# Patient Record
Sex: MALE | Race: WHITE | NOT HISPANIC OR LATINO | Employment: FULL TIME | URBAN - METROPOLITAN AREA
[De-identification: names, ages, dates, MRNs, and addresses within clinical notes are randomized per-mention and may not be internally consistent; named-entity substitution may affect disease eponyms.]

---

## 2023-10-18 ENCOUNTER — LAB (OUTPATIENT)
Dept: LAB | Facility: LAB | Age: 38
End: 2023-10-18
Payer: COMMERCIAL

## 2023-10-18 DIAGNOSIS — K51.90 ULCERATIVE COLITIS, UNSPECIFIED, WITHOUT COMPLICATIONS (MULTI): Primary | ICD-10-CM

## 2023-10-18 LAB
ALBUMIN SERPL-MCNC: 4.1 G/DL (ref 3.5–5)
ALP BLD-CCNC: 95 U/L (ref 35–125)
ALT SERPL-CCNC: 15 U/L (ref 5–40)
AST SERPL-CCNC: 14 U/L (ref 5–40)
BASOPHILS # BLD AUTO: 0.05 X10*3/UL (ref 0–0.1)
BASOPHILS NFR BLD AUTO: 0.5 %
BILIRUB DIRECT SERPL-MCNC: <0.2 MG/DL (ref 0–0.2)
BILIRUB SERPL-MCNC: 0.6 MG/DL (ref 0.1–1.2)
EOSINOPHIL # BLD AUTO: 0.16 X10*3/UL (ref 0–0.7)
EOSINOPHIL NFR BLD AUTO: 1.6 %
ERYTHROCYTE [DISTWIDTH] IN BLOOD BY AUTOMATED COUNT: 14.2 % (ref 11.5–14.5)
HCT VFR BLD AUTO: 41.9 % (ref 41–52)
HGB BLD-MCNC: 13 G/DL (ref 13.5–17.5)
IMM GRANULOCYTES # BLD AUTO: 0.04 X10*3/UL (ref 0–0.7)
IMM GRANULOCYTES NFR BLD AUTO: 0.4 % (ref 0–0.9)
LYMPHOCYTES # BLD AUTO: 3.41 X10*3/UL (ref 1.2–4.8)
LYMPHOCYTES NFR BLD AUTO: 33.2 %
MCH RBC QN AUTO: 25.4 PG (ref 26–34)
MCHC RBC AUTO-ENTMCNC: 31 G/DL (ref 32–36)
MCV RBC AUTO: 82 FL (ref 80–100)
MONOCYTES # BLD AUTO: 1.25 X10*3/UL (ref 0.1–1)
MONOCYTES NFR BLD AUTO: 12.2 %
NEUTROPHILS # BLD AUTO: 5.37 X10*3/UL (ref 1.2–7.7)
NEUTROPHILS NFR BLD AUTO: 52.1 %
NRBC BLD-RTO: 0 /100 WBCS (ref 0–0)
PLATELET # BLD AUTO: 348 X10*3/UL (ref 150–450)
PMV BLD AUTO: 9.9 FL (ref 7.5–11.5)
PROT SERPL-MCNC: 6.9 G/DL (ref 5.9–7.9)
RBC # BLD AUTO: 5.11 X10*6/UL (ref 4.5–5.9)
WBC # BLD AUTO: 10.3 X10*3/UL (ref 4.4–11.3)

## 2023-10-18 PROCEDURE — 36415 COLL VENOUS BLD VENIPUNCTURE: CPT

## 2023-10-18 PROCEDURE — 85025 COMPLETE CBC W/AUTO DIFF WBC: CPT

## 2023-10-18 PROCEDURE — 80076 HEPATIC FUNCTION PANEL: CPT

## 2023-10-19 ENCOUNTER — LAB (OUTPATIENT)
Dept: LAB | Facility: LAB | Age: 38
End: 2023-10-19
Payer: COMMERCIAL

## 2023-10-19 LAB — C DIF TOX TCDA+TCDB STL QL NAA+PROBE: DETECTED

## 2023-10-19 PROCEDURE — 87493 C DIFF AMPLIFIED PROBE: CPT

## 2023-11-01 DIAGNOSIS — K51.90 ULCERATIVE COLITIS, UNSPECIFIED, WITHOUT COMPLICATIONS (MULTI): Primary | ICD-10-CM

## 2023-11-02 ENCOUNTER — LAB (OUTPATIENT)
Dept: LAB | Facility: LAB | Age: 38
End: 2023-11-02
Payer: COMMERCIAL

## 2023-11-02 ENCOUNTER — APPOINTMENT (OUTPATIENT)
Dept: LAB | Facility: LAB | Age: 38
End: 2023-11-02
Payer: COMMERCIAL

## 2023-11-02 DIAGNOSIS — K51.90 ULCERATIVE COLITIS, UNSPECIFIED, WITHOUT COMPLICATIONS (MULTI): Primary | ICD-10-CM

## 2023-11-02 LAB — C DIF TOX TCDA+TCDB STL QL NAA+PROBE: DETECTED

## 2023-11-02 PROCEDURE — 87493 C DIFF AMPLIFIED PROBE: CPT

## 2024-01-26 ENCOUNTER — OFFICE VISIT (OUTPATIENT)
Dept: PRIMARY CARE | Facility: CLINIC | Age: 39
End: 2024-01-26
Payer: COMMERCIAL

## 2024-01-26 VITALS
BODY MASS INDEX: 34.72 KG/M2 | HEART RATE: 95 BPM | SYSTOLIC BLOOD PRESSURE: 98 MMHG | OXYGEN SATURATION: 98 % | TEMPERATURE: 98.3 F | WEIGHT: 242 LBS | DIASTOLIC BLOOD PRESSURE: 62 MMHG

## 2024-01-26 DIAGNOSIS — U07.1 COVID-19: Primary | ICD-10-CM

## 2024-01-26 PROCEDURE — 1036F TOBACCO NON-USER: CPT

## 2024-01-26 PROCEDURE — 99213 OFFICE O/P EST LOW 20 MIN: CPT

## 2024-01-26 RX ORDER — ALBUTEROL SULFATE 90 UG/1
2 AEROSOL, METERED RESPIRATORY (INHALATION) EVERY 4 HOURS PRN
Qty: 8.5 G | Refills: 0 | Status: SHIPPED | OUTPATIENT
Start: 2024-01-26 | End: 2025-01-25

## 2024-01-26 RX ORDER — FLUTICASONE PROPIONATE 50 MCG
1 SPRAY, SUSPENSION (ML) NASAL DAILY
Qty: 16 G | Refills: 5 | Status: SHIPPED | OUTPATIENT
Start: 2024-01-26 | End: 2025-01-25

## 2024-01-26 RX ORDER — UPADACITINIB 45 MG/1
45 TABLET, EXTENDED RELEASE ORAL DAILY
COMMUNITY

## 2024-01-26 ASSESSMENT — PATIENT HEALTH QUESTIONNAIRE - PHQ9
SUM OF ALL RESPONSES TO PHQ9 QUESTIONS 1 AND 2: 0
2. FEELING DOWN, DEPRESSED OR HOPELESS: NOT AT ALL
1. LITTLE INTEREST OR PLEASURE IN DOING THINGS: NOT AT ALL

## 2024-01-26 ASSESSMENT — ENCOUNTER SYMPTOMS
COUGH: 1
CHILLS: 1
MYALGIAS: 1

## 2024-01-26 ASSESSMENT — PAIN SCALES - GENERAL: PAINLEVEL: 3

## 2024-01-26 NOTE — PROGRESS NOTES
Subjective   Patient ID: Eren Palacios is a 38 y.o. male who presents for Sick Visit (Pt tested positive for covid yesterday and is c/o congestion, cough, body aches, and chills/sweats.).    HPI   Eren is seen today for c/o COVID-19 symptoms. Endorses cough, congestion, myalgias, chills/sweats, loss of taste that started yesterday. Tested positive yesterday. Has been taking Ibuprofen with relief. Tolerating fluids. History RAIN, UC on Rinvoq 45mg extended release. Denies history of asthma, non-smoker. Denies chest pain, shortness of breath, nausea, vomiting.    Review of Systems   Constitutional:  Positive for chills.   HENT:  Positive for congestion.    Respiratory:  Positive for cough.    Musculoskeletal:  Positive for myalgias.   All other systems reviewed and are negative.    Objective   BP 98/62   Pulse 95   Temp 36.8 °C (98.3 °F)   Wt 110 kg (242 lb)   SpO2 98%   BMI 34.72 kg/m²     Physical Exam  Vitals and nursing note reviewed.   Constitutional:       General: He is not in acute distress.  HENT:      Right Ear: Tympanic membrane and ear canal normal.      Left Ear: Tympanic membrane and ear canal normal.      Nose: Nose normal.      Mouth/Throat:      Pharynx: No oropharyngeal exudate or posterior oropharyngeal erythema.   Eyes:      Extraocular Movements: Extraocular movements intact.      Pupils: Pupils are equal, round, and reactive to light.   Cardiovascular:      Rate and Rhythm: Normal rate and regular rhythm.   Pulmonary:      Effort: Pulmonary effort is normal.      Breath sounds: Normal breath sounds.   Abdominal:      Tenderness: There is no abdominal tenderness.   Lymphadenopathy:      Cervical: No cervical adenopathy.   Skin:     General: Skin is warm.   Neurological:      General: No focal deficit present.      Mental Status: He is alert.   Psychiatric:         Mood and Affect: Mood normal.       Assessment/Plan   Problem List Items Addressed This Visit    None  Visit Diagnoses          Codes    COVID-19    -  Primary  Discussed supportive care due to mild symptoms vs. Lowering dose of Rinvoq and starting Paxlovid. Opted for supportive care.   Flonase as directed for congestion. OTC Tylenol or Ibuprofen for aches, fever. Fluids, rest  Albuterol inhaler as needed for cough, wheezing, shortness of breath.   Discussed CDC isolation guidelines.   Follow up if symptoms do not improve. Go to ER if condition worsens or becomes life-threatening.  U07.1    Relevant Medications    fluticasone (Flonase) 50 mcg/actuation nasal spray    albuterol (ProAir HFA) 90 mcg/actuation inhaler

## 2024-02-24 DIAGNOSIS — U07.1 COVID-19: ICD-10-CM

## 2024-02-26 RX ORDER — ALBUTEROL SULFATE 90 UG/1
2 AEROSOL, METERED RESPIRATORY (INHALATION) EVERY 4 HOURS PRN
OUTPATIENT
Start: 2024-02-26 | End: 2025-02-25

## 2024-03-05 DIAGNOSIS — K51.90 ULCERATIVE COLITIS, UNSPECIFIED, WITHOUT COMPLICATIONS (MULTI): Primary | ICD-10-CM

## 2024-03-07 ENCOUNTER — LAB (OUTPATIENT)
Dept: LAB | Facility: LAB | Age: 39
End: 2024-03-07
Payer: COMMERCIAL

## 2024-03-07 DIAGNOSIS — K51.90 ULCERATIVE COLITIS, UNSPECIFIED, WITHOUT COMPLICATIONS (MULTI): ICD-10-CM

## 2024-03-07 DIAGNOSIS — D64.9 ANEMIA, UNSPECIFIED TYPE: ICD-10-CM

## 2024-03-07 LAB
ALBUMIN SERPL-MCNC: 4.1 G/DL (ref 3.5–5)
ALP BLD-CCNC: 64 U/L (ref 35–125)
ALT SERPL-CCNC: 15 U/L (ref 5–40)
ANION GAP SERPL CALC-SCNC: 11 MMOL/L
AST SERPL-CCNC: 14 U/L (ref 5–40)
BASOPHILS # BLD AUTO: 0.05 X10*3/UL (ref 0–0.1)
BASOPHILS NFR BLD AUTO: 0.5 %
BILIRUB SERPL-MCNC: 0.5 MG/DL (ref 0.1–1.2)
BUN SERPL-MCNC: 13 MG/DL (ref 8–25)
C DIF TOX TCDA+TCDB STL QL NAA+PROBE: NOT DETECTED
CALCIUM SERPL-MCNC: 8.9 MG/DL (ref 8.5–10.4)
CHLORIDE SERPL-SCNC: 105 MMOL/L (ref 97–107)
CO2 SERPL-SCNC: 24 MMOL/L (ref 24–31)
CREAT SERPL-MCNC: 1.4 MG/DL (ref 0.4–1.6)
DACRYOCYTES BLD QL SMEAR: NORMAL
EGFRCR SERPLBLD CKD-EPI 2021: 67 ML/MIN/1.73M*2
EOSINOPHIL # BLD AUTO: 0.2 X10*3/UL (ref 0–0.7)
EOSINOPHIL NFR BLD AUTO: 2.1 %
ERYTHROCYTE [DISTWIDTH] IN BLOOD BY AUTOMATED COUNT: 18.6 % (ref 11.5–14.5)
GLUCOSE SERPL-MCNC: 95 MG/DL (ref 65–99)
HCT VFR BLD AUTO: 30.9 % (ref 41–52)
HGB BLD-MCNC: 8.4 G/DL (ref 13.5–17.5)
HYPOCHROMIA BLD QL SMEAR: NORMAL
IMM GRANULOCYTES # BLD AUTO: 0.02 X10*3/UL (ref 0–0.7)
IMM GRANULOCYTES NFR BLD AUTO: 0.2 % (ref 0–0.9)
LYMPHOCYTES # BLD AUTO: 2.4 X10*3/UL (ref 1.2–4.8)
LYMPHOCYTES NFR BLD AUTO: 25.6 %
MCH RBC QN AUTO: 19.4 PG (ref 26–34)
MCHC RBC AUTO-ENTMCNC: 27.2 G/DL (ref 32–36)
MCV RBC AUTO: 72 FL (ref 80–100)
MONOCYTES # BLD AUTO: 1.38 X10*3/UL (ref 0.1–1)
MONOCYTES NFR BLD AUTO: 14.7 %
NEUTROPHILS # BLD AUTO: 5.34 X10*3/UL (ref 1.2–7.7)
NEUTROPHILS NFR BLD AUTO: 56.9 %
NRBC BLD-RTO: 0 /100 WBCS (ref 0–0)
OVALOCYTES BLD QL SMEAR: NORMAL
PHOSPHATE SERPL-MCNC: 3.4 MG/DL (ref 2.5–4.5)
PLATELET # BLD AUTO: 429 X10*3/UL (ref 150–450)
POLYCHROMASIA BLD QL SMEAR: NORMAL
POTASSIUM SERPL-SCNC: 4.2 MMOL/L (ref 3.4–5.1)
PROT SERPL-MCNC: 7.2 G/DL (ref 5.9–7.9)
PTH-INTACT SERPL-MCNC: 60.7 PG/ML (ref 18.5–88)
RBC # BLD AUTO: 4.32 X10*6/UL (ref 4.5–5.9)
RBC MORPH BLD: NORMAL
SODIUM SERPL-SCNC: 140 MMOL/L (ref 133–145)
WBC # BLD AUTO: 9.4 X10*3/UL (ref 4.4–11.3)

## 2024-03-07 PROCEDURE — 85025 COMPLETE CBC W/AUTO DIFF WBC: CPT

## 2024-03-07 PROCEDURE — 82607 VITAMIN B-12: CPT

## 2024-03-07 PROCEDURE — 82746 ASSAY OF FOLIC ACID SERUM: CPT

## 2024-03-07 PROCEDURE — 83970 ASSAY OF PARATHORMONE: CPT

## 2024-03-07 PROCEDURE — 87493 C DIFF AMPLIFIED PROBE: CPT

## 2024-03-07 PROCEDURE — 82728 ASSAY OF FERRITIN: CPT

## 2024-03-07 PROCEDURE — 83550 IRON BINDING TEST: CPT

## 2024-03-07 PROCEDURE — 84100 ASSAY OF PHOSPHORUS: CPT

## 2024-03-07 PROCEDURE — 36415 COLL VENOUS BLD VENIPUNCTURE: CPT

## 2024-03-07 PROCEDURE — 80053 COMPREHEN METABOLIC PANEL: CPT

## 2024-03-07 PROCEDURE — 83540 ASSAY OF IRON: CPT

## 2024-03-08 ENCOUNTER — TELEPHONE (OUTPATIENT)
Dept: GASTROENTEROLOGY | Facility: HOSPITAL | Age: 39
End: 2024-03-08

## 2024-03-08 DIAGNOSIS — D64.9 ANEMIA, UNSPECIFIED TYPE: Primary | ICD-10-CM

## 2024-03-08 LAB
FERRITIN SERPL-MCNC: 7 NG/ML (ref 30–400)
FOLATE SERPL-MCNC: 7.7 NG/ML (ref 4.2–19.9)
IRON SATN MFR SERPL: ABNORMAL %
IRON SERPL-MCNC: <20 UG/DL (ref 45–160)
TIBC SERPL-MCNC: ABNORMAL UG/DL
UIBC SERPL-MCNC: 352 UG/DL (ref 110–370)
VIT B12 SERPL-MCNC: 398 PG/ML (ref 211–946)

## 2024-10-22 ENCOUNTER — OFFICE VISIT (OUTPATIENT)
Dept: PRIMARY CARE | Facility: EXTERNAL LOCATION | Age: 39
End: 2024-10-22

## 2024-10-22 VITALS
SYSTOLIC BLOOD PRESSURE: 142 MMHG | DIASTOLIC BLOOD PRESSURE: 85 MMHG | OXYGEN SATURATION: 96 % | HEART RATE: 71 BPM | TEMPERATURE: 97.9 F

## 2024-10-22 DIAGNOSIS — J01.90 ACUTE BACTERIAL SINUSITIS: ICD-10-CM

## 2024-10-22 DIAGNOSIS — J40 BRONCHITIS: Primary | ICD-10-CM

## 2024-10-22 DIAGNOSIS — Z11.59 ENCOUNTER FOR HEPATITIS C SCREENING TEST FOR LOW RISK PATIENT: ICD-10-CM

## 2024-10-22 DIAGNOSIS — Z11.4 SCREENING FOR HIV (HUMAN IMMUNODEFICIENCY VIRUS): ICD-10-CM

## 2024-10-22 DIAGNOSIS — Z13.1 SCREENING FOR DIABETES MELLITUS: ICD-10-CM

## 2024-10-22 DIAGNOSIS — Z13.220 SCREENING CHOLESTEROL LEVEL: ICD-10-CM

## 2024-10-22 DIAGNOSIS — B96.89 ACUTE BACTERIAL SINUSITIS: ICD-10-CM

## 2024-10-22 DIAGNOSIS — Z13.21 ENCOUNTER FOR VITAMIN DEFICIENCY SCREENING: ICD-10-CM

## 2024-10-22 RX ORDER — OZANIMOD HYDROCHLORIDE 0.92 MG/1
CAPSULE ORAL
COMMUNITY
Start: 2024-10-07

## 2024-10-22 RX ORDER — AMOXICILLIN AND CLAVULANATE POTASSIUM 875; 125 MG/1; MG/1
875 TABLET, FILM COATED ORAL 2 TIMES DAILY
Qty: 10 TABLET | Refills: 0 | Status: SHIPPED | OUTPATIENT
Start: 2024-10-22 | End: 2024-10-27

## 2024-10-22 ASSESSMENT — ENCOUNTER SYMPTOMS
HEADACHES: 1
PSYCHIATRIC NEGATIVE: 1
EYE PAIN: 0
ABDOMINAL PAIN: 0
APPETITE CHANGE: 0
ACTIVITY CHANGE: 1
FATIGUE: 1
MYALGIAS: 0
RHINORRHEA: 0
WHEEZING: 1
SHORTNESS OF BREATH: 0
CONSTIPATION: 0
EYE ITCHING: 0
VOMITING: 0
FEVER: 0
COUGH: 1
EYE REDNESS: 0
SINUS PRESSURE: 1
SORE THROAT: 1
SINUS PAIN: 1
SWOLLEN GLANDS: 1
NAUSEA: 0
CHILLS: 0
TROUBLE SWALLOWING: 1
DIARRHEA: 1

## 2024-10-22 NOTE — PROGRESS NOTES
Subjective   Patient ID: Eren Palacios is a 39 y.o. male who presents for Cough (W/ congestion x3 wks).    Pt presents with c/o cough and congestion x 3 weeks. Green yellow nasal mucus. Initially had fever. Has resolved. No known sick contacts.     URI   This is a new problem. The current episode started 1 to 4 weeks ago. The problem has been waxing and waning. There has been no fever. Associated symptoms include congestion, coughing, diarrhea, ear pain, headaches, a plugged ear sensation, sinus pain, a sore throat, swollen glands and wheezing (at night). Pertinent negatives include no abdominal pain, chest pain, nausea, rhinorrhea, sneezing or vomiting. He has tried NSAIDs (delsym.) for the symptoms. The treatment provided mild relief.   Cough  Associated symptoms include ear pain, headaches, postnasal drip, a sore throat and wheezing (at night). Pertinent negatives include no chest pain, chills, eye redness, fever, myalgias, rhinorrhea or shortness of breath.        Review of Systems   Constitutional:  Positive for activity change and fatigue. Negative for appetite change, chills and fever.   HENT:  Positive for congestion, ear pain, postnasal drip, sinus pressure, sinus pain, sore throat and trouble swallowing (pain with swallowing). Negative for ear discharge, rhinorrhea and sneezing.    Eyes:  Negative for pain, redness and itching.   Respiratory:  Positive for cough and wheezing (at night). Negative for shortness of breath.    Cardiovascular:  Negative for chest pain.   Gastrointestinal:  Positive for diarrhea. Negative for abdominal pain, constipation, nausea and vomiting.   Genitourinary:  Negative for decreased urine volume.   Musculoskeletal:  Negative for myalgias.   Neurological:  Positive for headaches.   Psychiatric/Behavioral: Negative.         Objective   /85   Pulse 71   Temp 36.6 °C (97.9 °F)   SpO2 96%     Physical Exam  Vitals reviewed.   Constitutional:       General: He is not in  acute distress.     Appearance: Normal appearance. He is obese.   HENT:      Head: Normocephalic.      Right Ear: Tympanic membrane, ear canal and external ear normal. There is no impacted cerumen.      Left Ear: Tympanic membrane, ear canal and external ear normal. There is no impacted cerumen.      Nose: Congestion and rhinorrhea present.      Mouth/Throat:      Mouth: Mucous membranes are moist.      Pharynx: Oropharynx is clear. Posterior oropharyngeal erythema present. No oropharyngeal exudate.   Eyes:      Conjunctiva/sclera: Conjunctivae normal.   Cardiovascular:      Rate and Rhythm: Normal rate and regular rhythm.      Heart sounds: No murmur heard.  Pulmonary:      Effort: Pulmonary effort is normal. No respiratory distress.      Breath sounds: Normal breath sounds. No stridor. No wheezing, rhonchi or rales.   Musculoskeletal:      Cervical back: Neck supple.   Lymphadenopathy:      Cervical: Cervical adenopathy present.   Skin:     General: Skin is dry.   Neurological:      General: No focal deficit present.      Mental Status: He is alert.   Psychiatric:         Mood and Affect: Mood normal.         Behavior: Behavior normal.         Thought Content: Thought content normal.         Judgment: Judgment normal.         Assessment/Plan   Diagnoses and all orders for this visit:  Bronchitis  -     amoxicillin-pot clavulanate (Augmentin) 875-125 mg tablet; Take 1 tablet (875 mg) by mouth 2 times a day for 5 days.  Acute bacterial sinusitis  -     amoxicillin-pot clavulanate (Augmentin) 875-125 mg tablet; Take 1 tablet (875 mg) by mouth 2 times a day for 5 days.  Encounter for hepatitis C screening test for low risk patient  -     Hepatitis C Antibody; Future  Screening for HIV (human immunodeficiency virus)  -     HIV 1/2 Antigen/Antibody Screen with Reflex to Confirmation; Future  Screening cholesterol level  -     Lipid panel; Future  Encounter for vitamin deficiency screening  -     Vitamin D  25-Hydroxy,Total (for eval of Vitamin D levels); Future  Screening for diabetes mellitus  -     Hemoglobin A1c; Future       -labs ordered for screening/care gaps   -follow up with PCP for annual physical.     -start antibiotic as ordered.   -will hold off on albuterol d/t interaction with home meds.     Upper respiratory infection-    Get plenty of rest and maintain hydration.    May use humidification in sleeping areas.  Use saline nasal spray to thin mucous.   May use throat lozenges, salt water gargles, or chloraseptic spray as needed to relieve sore throat.    OTC Cold Medications:  Tylenol/Ibuprofen- pain control and fever management  Guaifenesin - thins mucus - take with a full glass of water for best effect  Dextromethorphan - cough suppressant  Decongestants - dry up mucus - pseudoephedrine (stronger, but more side effects) and phenylephrine - be cautious if high blood pressure  Flonase- nasal steroid to improve nasal inflammation.    Use good hand hygiene to prevent the spread of germs.    Follow up if symptoms persist greater than 7 days.  Seek emergency medical care if fever above 104F, difficulty swallowing, or difficulty breathing occurs.

## 2024-10-22 NOTE — PATIENT INSTRUCTIONS
Augmentin ordered instead of Azithromycin due to interaction between Zeposia and Azithromycin.     I will hold off on starting the albuterol inhaler as we discussed as it interacts with Zeposia.     I placed orders for a few different labs to be checked. They are all routine screenings.     Please follow up with your PCP for annual physical.     If no improvement in 5-7 days please follow up.

## 2024-10-31 ENCOUNTER — LAB (OUTPATIENT)
Dept: LAB | Facility: LAB | Age: 39
End: 2024-10-31
Payer: COMMERCIAL

## 2024-10-31 DIAGNOSIS — R19.7 DIARRHEA, UNSPECIFIED: Primary | ICD-10-CM

## 2024-10-31 LAB — C DIF TOX TCDA+TCDB STL QL NAA+PROBE: NOT DETECTED

## 2024-10-31 PROCEDURE — 87493 C DIFF AMPLIFIED PROBE: CPT

## 2025-04-14 ENCOUNTER — LAB (OUTPATIENT)
Dept: LAB | Facility: HOSPITAL | Age: 40
End: 2025-04-14
Payer: COMMERCIAL